# Patient Record
Sex: FEMALE | Race: BLACK OR AFRICAN AMERICAN | NOT HISPANIC OR LATINO | URBAN - METROPOLITAN AREA
[De-identification: names, ages, dates, MRNs, and addresses within clinical notes are randomized per-mention and may not be internally consistent; named-entity substitution may affect disease eponyms.]

---

## 2024-06-25 ENCOUNTER — APPOINTMENT (OUTPATIENT)
Dept: RADIOLOGY | Facility: HOSPITAL | Age: 24
End: 2024-06-25
Payer: COMMERCIAL

## 2024-06-25 ENCOUNTER — HOSPITAL ENCOUNTER (EMERGENCY)
Facility: HOSPITAL | Age: 24
Discharge: HOME | End: 2024-06-25
Payer: COMMERCIAL

## 2024-06-25 VITALS
WEIGHT: 139 LBS | OXYGEN SATURATION: 100 % | TEMPERATURE: 99.7 F | HEART RATE: 68 BPM | DIASTOLIC BLOOD PRESSURE: 80 MMHG | BODY MASS INDEX: 22.34 KG/M2 | HEIGHT: 66 IN | RESPIRATION RATE: 18 BRPM | SYSTOLIC BLOOD PRESSURE: 110 MMHG

## 2024-06-25 DIAGNOSIS — O20.0 THREATENED MISCARRIAGE (HHS-HCC): Primary | ICD-10-CM

## 2024-06-25 DIAGNOSIS — R82.81 PYURIA: ICD-10-CM

## 2024-06-25 LAB
ABO GROUP (TYPE) IN BLOOD: NORMAL
ALBUMIN SERPL BCP-MCNC: 3.9 G/DL (ref 3.4–5)
ALP SERPL-CCNC: 28 U/L (ref 33–110)
ALT SERPL W P-5'-P-CCNC: 5 U/L (ref 7–45)
ANION GAP SERPL CALC-SCNC: 11 MMOL/L (ref 10–20)
ANTIBODY SCREEN: NORMAL
APPEARANCE UR: ABNORMAL
APTT PPP: 23 SECONDS (ref 27–38)
AST SERPL W P-5'-P-CCNC: 27 U/L (ref 9–39)
B-HCG SERPL-ACNC: 51 MIU/ML
BASOPHILS # BLD AUTO: 0.02 X10*3/UL (ref 0–0.1)
BASOPHILS NFR BLD AUTO: 0.3 %
BILIRUB SERPL-MCNC: 0.4 MG/DL (ref 0–1.2)
BILIRUB UR STRIP.AUTO-MCNC: NEGATIVE MG/DL
BUN SERPL-MCNC: 12 MG/DL (ref 6–23)
CALCIUM SERPL-MCNC: 8.6 MG/DL (ref 8.6–10.3)
CHLORIDE SERPL-SCNC: 103 MMOL/L (ref 98–107)
CO2 SERPL-SCNC: 22 MMOL/L (ref 21–32)
COLOR UR: ABNORMAL
CREAT SERPL-MCNC: 0.67 MG/DL (ref 0.5–1.05)
EGFRCR SERPLBLD CKD-EPI 2021: >90 ML/MIN/1.73M*2
EOSINOPHIL # BLD AUTO: 0.04 X10*3/UL (ref 0–0.7)
EOSINOPHIL NFR BLD AUTO: 0.6 %
ERYTHROCYTE [DISTWIDTH] IN BLOOD BY AUTOMATED COUNT: 16.2 % (ref 11.5–14.5)
GLUCOSE SERPL-MCNC: 88 MG/DL (ref 74–99)
GLUCOSE UR STRIP.AUTO-MCNC: NORMAL MG/DL
HCT VFR BLD AUTO: 38.5 % (ref 36–46)
HGB BLD-MCNC: 11.2 G/DL (ref 12–16)
IMM GRANULOCYTES # BLD AUTO: 0.01 X10*3/UL (ref 0–0.7)
IMM GRANULOCYTES NFR BLD AUTO: 0.1 % (ref 0–0.9)
INR PPP: 1 (ref 0.9–1.1)
KETONES UR STRIP.AUTO-MCNC: NEGATIVE MG/DL
LEUKOCYTE ESTERASE UR QL STRIP.AUTO: ABNORMAL
LYMPHOCYTES # BLD AUTO: 2.56 X10*3/UL (ref 1.2–4.8)
LYMPHOCYTES NFR BLD AUTO: 38 %
MCH RBC QN AUTO: 25.6 PG (ref 26–34)
MCHC RBC AUTO-ENTMCNC: 29.1 G/DL (ref 32–36)
MCV RBC AUTO: 88 FL (ref 80–100)
MONOCYTES # BLD AUTO: 0.57 X10*3/UL (ref 0.1–1)
MONOCYTES NFR BLD AUTO: 8.5 %
NEUTROPHILS # BLD AUTO: 3.53 X10*3/UL (ref 1.2–7.7)
NEUTROPHILS NFR BLD AUTO: 52.5 %
NITRITE UR QL STRIP.AUTO: NEGATIVE
NRBC BLD-RTO: 0 /100 WBCS (ref 0–0)
PH UR STRIP.AUTO: 7.5 [PH]
PLATELET # BLD AUTO: 302 X10*3/UL (ref 150–450)
POTASSIUM SERPL-SCNC: 4.4 MMOL/L (ref 3.5–5.3)
PROT SERPL-MCNC: 7.2 G/DL (ref 6.4–8.2)
PROT UR STRIP.AUTO-MCNC: ABNORMAL MG/DL
PROTHROMBIN TIME: 11.7 SECONDS (ref 9.8–12.8)
RBC # BLD AUTO: 4.37 X10*6/UL (ref 4–5.2)
RBC # UR STRIP.AUTO: ABNORMAL /UL
RBC #/AREA URNS AUTO: >20 /HPF
RH FACTOR (ANTIGEN D): NORMAL
SODIUM SERPL-SCNC: 132 MMOL/L (ref 136–145)
SP GR UR STRIP.AUTO: 1.01
SQUAMOUS #/AREA URNS AUTO: ABNORMAL /HPF
UROBILINOGEN UR STRIP.AUTO-MCNC: NORMAL MG/DL
WBC # BLD AUTO: 6.7 X10*3/UL (ref 4.4–11.3)
WBC #/AREA URNS AUTO: ABNORMAL /HPF

## 2024-06-25 PROCEDURE — 2500000002 HC RX 250 W HCPCS SELF ADMINISTERED DRUGS (ALT 637 FOR MEDICARE OP, ALT 636 FOR OP/ED): Performed by: PHYSICIAN ASSISTANT

## 2024-06-25 PROCEDURE — 36415 COLL VENOUS BLD VENIPUNCTURE: CPT | Performed by: PHYSICIAN ASSISTANT

## 2024-06-25 PROCEDURE — 87086 URINE CULTURE/COLONY COUNT: CPT | Mod: AHULAB | Performed by: PHYSICIAN ASSISTANT

## 2024-06-25 PROCEDURE — 76817 TRANSVAGINAL US OBSTETRIC: CPT

## 2024-06-25 PROCEDURE — 76801 OB US < 14 WKS SINGLE FETUS: CPT | Mod: FOREIGN READ | Performed by: SPECIALIST

## 2024-06-25 PROCEDURE — 85730 THROMBOPLASTIN TIME PARTIAL: CPT | Performed by: PHYSICIAN ASSISTANT

## 2024-06-25 PROCEDURE — 81003 URINALYSIS AUTO W/O SCOPE: CPT | Performed by: PHYSICIAN ASSISTANT

## 2024-06-25 PROCEDURE — 84702 CHORIONIC GONADOTROPIN TEST: CPT | Performed by: PHYSICIAN ASSISTANT

## 2024-06-25 PROCEDURE — 85610 PROTHROMBIN TIME: CPT | Performed by: PHYSICIAN ASSISTANT

## 2024-06-25 PROCEDURE — 86901 BLOOD TYPING SEROLOGIC RH(D): CPT | Performed by: PHYSICIAN ASSISTANT

## 2024-06-25 PROCEDURE — 76817 TRANSVAGINAL US OBSTETRIC: CPT | Mod: FOREIGN READ | Performed by: SPECIALIST

## 2024-06-25 PROCEDURE — 85025 COMPLETE CBC W/AUTO DIFF WBC: CPT | Performed by: PHYSICIAN ASSISTANT

## 2024-06-25 PROCEDURE — 99284 EMERGENCY DEPT VISIT MOD MDM: CPT | Mod: 25

## 2024-06-25 PROCEDURE — 82374 ASSAY BLOOD CARBON DIOXIDE: CPT | Performed by: PHYSICIAN ASSISTANT

## 2024-06-25 RX ORDER — NITROFURANTOIN 25; 75 MG/1; MG/1
100 CAPSULE ORAL 2 TIMES DAILY
Qty: 10 CAPSULE | Refills: 0 | Status: SHIPPED | OUTPATIENT
Start: 2024-06-25 | End: 2024-06-30

## 2024-06-25 RX ORDER — NITROFURANTOIN 25; 75 MG/1; MG/1
100 CAPSULE ORAL EVERY 12 HOURS SCHEDULED
Status: DISCONTINUED | OUTPATIENT
Start: 2024-06-25 | End: 2024-06-25 | Stop reason: HOSPADM

## 2024-06-25 ASSESSMENT — COLUMBIA-SUICIDE SEVERITY RATING SCALE - C-SSRS
1. IN THE PAST MONTH, HAVE YOU WISHED YOU WERE DEAD OR WISHED YOU COULD GO TO SLEEP AND NOT WAKE UP?: NO
2. HAVE YOU ACTUALLY HAD ANY THOUGHTS OF KILLING YOURSELF?: NO
6. HAVE YOU EVER DONE ANYTHING, STARTED TO DO ANYTHING, OR PREPARED TO DO ANYTHING TO END YOUR LIFE?: NO

## 2024-06-25 ASSESSMENT — PAIN SCALES - GENERAL: PAINLEVEL_OUTOF10: 5 - MODERATE PAIN

## 2024-06-25 ASSESSMENT — PAIN DESCRIPTION - DESCRIPTORS: DESCRIPTORS: CRAMPING

## 2024-06-25 ASSESSMENT — PAIN - FUNCTIONAL ASSESSMENT: PAIN_FUNCTIONAL_ASSESSMENT: 0-10

## 2024-06-25 ASSESSMENT — PAIN DESCRIPTION - ORIENTATION: ORIENTATION: LOWER

## 2024-06-25 ASSESSMENT — PAIN DESCRIPTION - LOCATION: LOCATION: ABDOMEN

## 2024-06-25 NOTE — ED TRIAGE NOTES
Pt presents with the c/o vaginal cramping and bleeding during pregnancy. Pt states that she had taken 3 tests at home and a test for her PCP all confirming pregnant. Pt has not had the chance to make an appointment with OB/GYN yet. Pt states that she got home from her PCP today and she is starting to experience 5/10 lower abd cramping. Pt states that she got in the shower and noticed she started passing blood clots.

## 2024-06-25 NOTE — ED PROVIDER NOTES
HPI   Chief Complaint   Patient presents with    Vaginal Bleeding - Pregnant       History of present illness:  24 year old A0 female presents with vaginal bleeding which began 1.5 hours ago. She reports passage of blood clots as well. Patient also notes pelvic cramping which began yesterday that feels similar to menstrual cramping. She denies dysuria, nausea, vomiting, fevers, chills, concern of STI noting she was tested yesterday at her PCP.  Patient has had 3 positive urine pregnancy tests for her current pregnancy, but no blood work or ultrasound yet.  Patient denies any complications with her prior pregnancy, but notes she did have vaginal bleeding early in her past pregnancy. She does have a preexisting appointment with her OB/GYN tomorrow.  Her LMP was 5/15/-.     Review of systems: Constitutional, eye, ENT, cardiovascular, respiratory, gastrointestinal, genitourinary, neurologic, musculoskeletal, dermatologic, hematologic, endocrine systems were evaluated and were negative unless otherwise specified in history of present illness.    Medications: Reviewed and per nursing note.    Family history: Denies relevant medical conditions.    Past medical history: None per patient    Social history: Denies tobacco, alcohol, drug use.      Physical exam:    Appearance: Well-developed, well-nourished, nontoxic-appearing, alert and oriented x3. Talking in complete sentences.    HEENT:  Head normocephalic atraumatic, extraocular movements intact, mucous membranes are moist and pink, trachea is midline.    NECK:  Nml Inspection    Respiratory: Clear to auscultation bilaterally with normal bilateral excursion. No wheezes, rhonchi, rales.    Cardiovascular: Regular rate and rhythm, no murmurs rubs or gallops.    GI: Tenderness suprapubic region.  Soft, nondistended.    Female genital: vaginal blood in vault.  No active bleeding.  Normal external vagina.  Os is closed.  No uterine, adnexal, or cervical motion  tenderness.    Neuro:  Oriented x 3, Speech Clear, cranial nerves grossly intact.    Musculoskeletal: Patient spontaneously moves all 4 extremities.    Skin:  No rashes.                          Vivian Coma Scale Score: 15                     Patient History   History reviewed. No pertinent past medical history.  History reviewed. No pertinent surgical history.  No family history on file.  Social History     Tobacco Use    Smoking status: Never    Smokeless tobacco: Never   Substance Use Topics    Alcohol use: Not on file     Comment: socially    Drug use: Never       Physical Exam   ED Triage Vitals [06/25/24 1703]   Temperature Heart Rate Respirations BP   37.6 °C (99.7 °F) 67 20 118/79      Pulse Ox Temp Source Heart Rate Source Patient Position   100 % Oral -- --      BP Location FiO2 (%)     -- --       Physical Exam    ED Course & MDM   Diagnoses as of 06/25/24 2101   Threatened miscarriage ([facility])   Pyuria       Medical Decision Making  Labs Reviewed  CBC WITH AUTO DIFFERENTIAL - Abnormal     WBC                           6.7                    nRBC                          0.0                    RBC                           4.37                   Hemoglobin                    11.2 (*)               Hematocrit                    38.5                   MCV                           88                     MCH                           25.6 (*)               MCHC                          29.1 (*)               RDW                           16.2 (*)               Platelets                     302                    Neutrophils %                 52.5                   Immature Granulocytes %, Automated   0.1                    Lymphocytes %                 38.0                   Monocytes %                   8.5                    Eosinophils %                 0.6                    Basophils %                   0.3                    Neutrophils Absolute          3.53                   Immature Granulocytes  Absolute, Au*   0.01                   Lymphocytes Absolute          2.56                   Monocytes Absolute            0.57                   Eosinophils Absolute          0.04                   Basophils Absolute            0.02                COMPREHENSIVE METABOLIC PANEL - Abnormal     Glucose                       88                     Sodium                        132 (*)                Potassium                     4.4                    Chloride                      103                    Bicarbonate                   22                     Anion Gap                     11                     Urea Nitrogen                 12                     Creatinine                    0.67                   eGFR                          >90                    Calcium                       8.6                    Albumin                       3.9                    Alkaline Phosphatase          28 (*)                 Total Protein                 7.2                    AST                           27                     Bilirubin, Total              0.4                    ALT                           5 (*)               APTT - Abnormal     aPTT                          23 (*)                     Narrative: The APTT is no longer used for monitoring Unfractionated Heparin Therapy. For monitoring Heparin Therapy, use the Heparin Assay.  HUMAN CHORIONIC GONADOTROPIN, SERUM QUANTITATIVE - Abnormal     HCG, Beta-Quantitative        51 (*)                     Narrative:  Total HCG measurement is performed using the [employer] Access                   Immunoassay which detects intact HCG and free beta HCG subunit.                    This test is not indicated for use as a tumor marker.                   HCG testing is performed using a different test methodology at [facility] than other [facility]. Direct result comparison                   should only be made within the same  method.                      URINALYSIS WITH REFLEX CULTURE AND MICROSCOPIC - Abnormal     Color, Urine                  Light-Brown (*)               Appearance, Urine             Turbid (*)               Specific Gravity, Urine       1.012                  pH, Urine                     7.5                    Protein, Urine                10 (TRACE)                Glucose, Urine                Normal                 Blood, Urine                  OVER (3+) (*)               Ketones, Urine                NEGATIVE                Bilirubin, Urine              NEGATIVE                Urobilinogen, Urine           Normal                 Nitrite, Urine                NEGATIVE                Leukocyte Esterase, Urine     75 Prashanth/µL (*)            MICROSCOPIC ONLY, URINE - Abnormal     WBC, Urine                    11-20 (*)               RBC, Urine                    >20 (*)                Squamous Epithelial Cells, Urine                       PROTIME-INR - Normal     Protime                       11.7                   INR                           1.0                 URINE CULTURE  TYPE AND SCREEN     ABO TYPE                      A                      Rh TYPE                       POS                    ANTIBODY SCREEN               NEG                 URINALYSIS WITH REFLEX CULTURE AND MICROSCOPIC         Narrative: The following orders were created for panel order Urinalysis with Reflex Culture and Microscopic.                  Procedure                               Abnormality         Status                                     ---------                               -----------         ------                                     Urinalysis with Reflex C...[[id number]]  Abnormal            Final result                               Extra Urine Gray Tube[[id number]]                                                                                         Please view results for these tests on the individual orders.  EXTRA  URINE GRAY TUBE    US PELVIS OB TRANSABDOMINAL W TRANSVAGINAL UP TO 1ST TRIMESTER   Final Result    There is no evidence of an intrauterine pregnancy.  Ectopic pregnancy    is neither seen nor excluded at this time.  Continued monitoring with    serial Beta hCG values and/or follow up ultrasound is recommended.      Normal color and spectral Doppler flow within the bilateral ovaries.    Signed by Carlos Brooks MD         24-year-old female complains of vaginal bleeding and pain.  Differential diagnosis of threatened miscarriage, ectopic pregnancy, abnormal uterine bleeding, endometriosis.  Examination shows tenderness in the suprapubic region.  Small blood in vaginal vault.  No significant active bleeding.  Low-grade temperature.  Previously had STD testing  and does not feel this is consistent.  Patient previously pregnant and had some bleeding in early pregnancy.  Recently pregnancy test positive.    CBC CMP type and screen coags urinalysis beta-hCG level pelvic ultrasound ordered.    Diagnostic studies show CBC with slightly low hemoglobin 11.2 with normal hematocrit, normal white blood cell count, beta-hCG level 51 which could be consistent with early pregnancy.  Chemistry shows sodium 132 with normal glucose renal function.  No significant abnormalities otherwise.  A positive blood type does not require RhoGAM.  Urinalysis shows leukocyte Estrace with white blood cells.  Patient has pyuria and early pregnancy will treat for the pyuria.    Pelvic ultrasound shows no obvious evidence of ectopic pregnancy or clear evidence of intrauterine pregnancy at this time.  Recommend follow-up reevaluation.  Patient had no significant continued bleeding in emergency department.  She had no significant pain.  She was observed for over 4 hours.    Patient's presentation consistent with threatened miscarriage and cannot entirely rule out ectopic pregnancy.  Will need to follow-up with her obstetrician with repeat beta-hCG  level and reevaluation.  Will treat for pyuria with nitrofurantoin.    Patient will be discharged to home with prescription.  Patient is educated in signs and symptoms of worsening symptoms and reasons to come back to the emergency department.  Will need to follow up with OB.  Patient does not report social determinants of health impacting ability to obtain care that is needed.  Patient agrees with plan.    This is a transcription.  Text was reviewed for errors, but some transcription errors may remain.  Please call for any questions.          Procedure  Procedures     Spike Benjamin PA-C  06/25/24 2058       Spike Benjamin PA-C  06/25/24 2101

## 2024-06-27 LAB — BACTERIA UR CULT: NORMAL

## 2025-02-19 ENCOUNTER — OFFICE VISIT (OUTPATIENT)
Dept: URGENT CARE | Age: 25
End: 2025-02-19
Payer: COMMERCIAL

## 2025-02-19 VITALS
DIASTOLIC BLOOD PRESSURE: 70 MMHG | OXYGEN SATURATION: 95 % | SYSTOLIC BLOOD PRESSURE: 106 MMHG | TEMPERATURE: 100.9 F | HEART RATE: 90 BPM

## 2025-02-19 DIAGNOSIS — Z20.822 SUSPECTED 2019 NOVEL CORONAVIRUS INFECTION: ICD-10-CM

## 2025-02-19 DIAGNOSIS — R10.9 ABDOMINAL PAIN IN FEMALE PATIENT: ICD-10-CM

## 2025-02-19 DIAGNOSIS — N10 ACUTE PYELONEPHRITIS: Primary | ICD-10-CM

## 2025-02-19 LAB
POC APPEARANCE, URINE: CLEAR
POC BILIRUBIN, URINE: NEGATIVE
POC BLOOD, URINE: ABNORMAL
POC COLOR, URINE: YELLOW
POC GLUCOSE, URINE: NEGATIVE MG/DL
POC KETONES, URINE: ABNORMAL MG/DL
POC LEUKOCYTES, URINE: ABNORMAL
POC NITRITE,URINE: POSITIVE
POC PH, URINE: 6 PH
POC PROTEIN, URINE: ABNORMAL MG/DL
POC RAPID INFLUENZA A: NEGATIVE
POC RAPID INFLUENZA B: NEGATIVE
POC SARS-COV-2 AG BINAX: NORMAL
POC SPECIFIC GRAVITY, URINE: 1.01
POC UROBILINOGEN, URINE: 0.2 EU/DL
PREGNANCY TEST URINE, POC: NEGATIVE

## 2025-02-19 RX ORDER — CEPHALEXIN 500 MG/1
500 CAPSULE ORAL 2 TIMES DAILY
Qty: 30 CAPSULE | Refills: 0 | Status: SHIPPED | OUTPATIENT
Start: 2025-02-19 | End: 2025-02-26

## 2025-02-19 NOTE — PROGRESS NOTES
Subjective   Patient ID: Almita Hampton is a 24 y.o. female. They present today with a chief complaint of Fever (Pt states x1week, says lower rt back pain and it moves to rt abd) and Headache (Pt says pounding headache, low appetite ).        Past Medical History  Allergies as of 02/19/2025 - Reviewed 02/19/2025   Allergen Reaction Noted    Nut - unspecified Anaphylaxis 06/25/2024    Peanut Unknown 02/15/2010       (Not in a hospital admission)       History reviewed. No pertinent past medical history.    History reviewed. No pertinent surgical history.     reports that she has never smoked. She has never used smokeless tobacco. She reports that she does not use drugs.    Review of Systems  Review of Systems                               Objective    Vitals:    02/19/25 1518   BP: 106/70   Pulse: 90   Temp: (!) 38.3 °C (100.9 °F)   SpO2: 95%     Patient's last menstrual period was 02/19/2025 (exact date).    Physical Exam  Vitals reviewed.   Constitutional:       Appearance: She is ill-appearing.   HENT:      Head: Normocephalic and atraumatic.      Right Ear: Tympanic membrane and ear canal normal. No tenderness.      Left Ear: Tympanic membrane and ear canal normal. No tenderness.      Nose: Nose normal. No congestion or rhinorrhea.      Mouth/Throat:      Mouth: Mucous membranes are moist.      Pharynx: Oropharynx is clear. No pharyngeal swelling, oropharyngeal exudate or posterior oropharyngeal erythema.   Eyes:      Extraocular Movements: Extraocular movements intact.      Conjunctiva/sclera: Conjunctivae normal.   Cardiovascular:      Rate and Rhythm: Normal rate and regular rhythm.      Heart sounds: No murmur heard.  Pulmonary:      Effort: Pulmonary effort is normal.      Breath sounds: Normal breath sounds. No decreased breath sounds, wheezing, rhonchi or rales.   Abdominal:      General: Bowel sounds are normal.      Palpations: Abdomen is soft.      Tenderness: There is abdominal tenderness in the  suprapubic area. There is right CVA tenderness. There is no left CVA tenderness.   Skin:     General: Skin is warm.   Neurological:      Mental Status: She is alert and oriented to person, place, and time.   Psychiatric:         Mood and Affect: Mood normal.         Behavior: Behavior normal.             Point of Care Test & Imaging Results from this visit  Results for orders placed or performed in visit on 02/19/25   POCT Covid-19 Rapid Antigen   Result Value Ref Range    POC BRUCE-COV-2 AG  Presumptive negative test for SARS-CoV-2 (no antigen detected)     Presumptive negative test for SARS-CoV-2 (no antigen detected)   POCT Influenza A/B manually resulted   Result Value Ref Range    POC Rapid Influenza A Negative Negative    POC Rapid Influenza B Negative Negative   POCT pregnancy, urine manually resulted   Result Value Ref Range    Preg Test, Ur Negative Negative   POCT UA Automated manually resulted   Result Value Ref Range    POC Color, Urine Yellow Straw, Yellow, Light-Yellow    POC Appearance, Urine Clear Clear    POC Glucose, Urine NEGATIVE NEGATIVE mg/dl    POC Bilirubin, Urine NEGATIVE NEGATIVE    POC Ketones, Urine TRACE (A) NEGATIVE mg/dl    POC Specific Gravity, Urine 1.015 1.005 - 1.035    POC Blood, Urine MODERATE (2+) (A) NEGATIVE    POC PH, Urine 6.0 No Reference Range Established PH    POC Protein, Urine 15 (1+) (A) NEGATIVE mg/dl    POC Urobilinogen, Urine 0.2 0.2, 1.0 EU/DL    Poc Nitrite, Urine POSITIVE (A) NEGATIVE    POC Leukocytes, Urine SMALL (1+) (A) NEGATIVE      No results found.    Diagnostic study results (if any) were reviewed by Aniket Clay PA-C.    Assessment/Plan   Allergies, medications, history, and pertinent labs/EKGs/Imaging reviewed by Aniket Clay PA-C.     Medical Decision Making  Patient's symptoms are suspicious for acute pyelonephritis.  Positive UA.  Negative U hCG.  Urine culture sent out.  I will start patient on Keflex.  I advised patient to start on ibuprofen as  needed for fever and back pain.  I instructed patient to proceed to the emergency room if her symptoms got worsen and if she is not responding to the antibiotics.  -         Patient is educated about their diagnoses.     -          Discussed medications benefits and adverse effects.     -          Answered all patient’s questions.     -          Patient will call 911 or go to the nearest ED if worsen symptoms .     -          Patient is agreeable to the plan of care and is deemed stable upon discharge.     -          Follow up with your primary care provider in two days.    Orders and Diagnoses  Diagnoses and all orders for this visit:  Acute pyelonephritis  -     cephalexin (Keflex) 500 mg capsule; Take 1 capsule (500 mg) by mouth 2 times a day for 7 days. Discard remainder  -     Urine Culture  Suspected 2019 novel coronavirus infection  -     POCT Covid-19 Rapid Antigen  -     POCT Influenza A/B manually resulted  Abdominal pain in female patient  -     POCT Covid-19 Rapid Antigen  -     POCT Influenza A/B manually resulted  -     POCT pregnancy, urine manually resulted  -     POCT UA Automated manually resulted  -     Urine Culture      Medical Admin Record      Patient disposition: Home    Electronically signed by Aniket Clay PA-C  5:44 PM

## 2025-02-21 LAB — BACTERIA UR CULT: ABNORMAL
